# Patient Record
(demographics unavailable — no encounter records)

---

## 2024-10-24 NOTE — PHYSICAL EXAM
[Right] : right knee [Equivocal] : equivocal Missy [Left] : left knee [5___] : hamstring 5[unfilled]/5 [] : patient ambulates without assistive device [TWNoteComboBox7] : flexion 125 degrees [de-identified] : extension 0 degrees

## 2024-10-24 NOTE — HISTORY OF PRESENT ILLNESS
[Sudden] : sudden [5] : 5 [4] : 4 [Dull/Aching] : dull/aching [Intermittent] : intermittent [Rest] : rest [Meds] : meds [Walking] : walking [Stairs] : stairs [de-identified] : pain in bilateral knees, right greater than left. Pain for many months ago, she feels it was secondary to long distance driving and moving her daughter into school. Symptoms are worse with stairs, kneeling, twisting motions and occasionally feels like the knee is going to give way when descending stairs. The left knee started to trouble her more recently, feels it may be secondary to compensating for the right knee. She is taking Tylenol with some help. Poss allergy to NSAIds [] : no [FreeTextEntry1] : RT KNEE  [FreeTextEntry5] : Patient states a few months ago she was treated for right knee pain and had X-ray and aspiration done.  [FreeTextEntry6] : stiffness [FreeTextEntry9] : tylenol  [facility]  [de-identified] : MRI

## 2024-10-24 NOTE — DISCUSSION/SUMMARY
[de-identified] : modify activities use elastic sleeve for structural support try OTC meds ice as needed try topical lidocaine for pain control reviewed current medications used by this patient home exercises for functional return 10/24/2024  RE:  JOSIANE MCKEON   [id number]  Attention:  Nurse Reviewer /Medical Director  I am writing this letter as a medical necessity for HA euflexxa both knees Patient has tried analgesics, non-steroid anti-inflammatory agents,  physical therapy, hot or cold compresses,injections of corticosteroids, etc)  which in combination or by themselves has not worked. Based on my patient's condition, I strongly believe that the Hyaluronic aid injections is medically needed.   Thank you for your time and consideration.

## 2024-11-04 NOTE — PHYSICAL EXAM
[Right] : right knee [Equivocal] : equivocal Missy [Left] : left knee [5___] : hamstring 5[unfilled]/5 [] : negative Lachmann [TWNoteComboBox7] : flexion 125 degrees [de-identified] : extension 0 degrees

## 2024-11-04 NOTE — PHYSICAL EXAM
[Right] : right knee [Equivocal] : equivocal Missy [Left] : left knee [5___] : hamstring 5[unfilled]/5 [] : negative Lachmann [de-identified] : extension 0 degrees [TWNoteComboBox7] : flexion 125 degrees

## 2024-11-04 NOTE — DISCUSSION/SUMMARY
[de-identified] : rest, ice, activity modification   Frequency of visco and csi reviewed. Return in 6 weeks as needed.

## 2024-11-04 NOTE — HISTORY OF PRESENT ILLNESS
[de-identified] : Patient returns for her knees, she remains symptomatic, tolerating Euflexxa series well.

## 2024-11-04 NOTE — HISTORY OF PRESENT ILLNESS
[de-identified] : Patient returns for her knees, she remains symptomatic, tolerating Euflexxa series well.

## 2024-11-04 NOTE — PROCEDURE
[FreeTextEntry3] : Euflexxa (Large Joint) with Ultrasound Guidance Viscosupplementation Injection: X-ray evidence of Osteoarthritis on this or prior visit and Patient has tried OTC's including aspirin, Ibuprofen, Aleve etc or prescription NSAIDS, and/or exercises at home and/ or physical therapy without satisfactory response.  An injection of Euflexxa 2ml #_2_ was injected into the bilateral knee(s). after verbal consent using sterile technique. The risks, benefits, and alternatives to Viscosupplementation injection were explained in full to the patient. Risks outlined include but are not limited to infection, sepsis, bleeding, scarring, skin discoloration, temporary increase in pain, syncopal episode, failure to resolve symptoms, allergic reaction, and symptom recurrence. Signs and symptoms of infection reviewed and patient advised to call immediately for redness, fevers, and/or chills. Patient understood the risks. All questions were answered. After discussion of options, patient requested Viscosupplementation. Oral informed consent was obtained and sterile prep was done of the injection site. Sterile technique was used without complications. The patient tolerated the procedure well. Ice tonight to the injection site.   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   Ultrasound guided injection was performed of the knee, visualization of the needle and placement of injection was performed without complication.

## 2024-11-04 NOTE — DISCUSSION/SUMMARY
[de-identified] : rest, ice, activity modification   Frequency of visco and csi reviewed. Return in 6 weeks as needed.

## 2024-11-11 NOTE — DISCUSSION/SUMMARY
[de-identified] : rest, ice, activity modification   Frequency of visco and csi reviewed. Return in 6 weeks as needed.

## 2024-11-11 NOTE — PROCEDURE
[FreeTextEntry3] : Euflexxa (Large Joint) with Ultrasound Guidance Viscosupplementation Injection: X-ray evidence of Osteoarthritis on this or prior visit and Patient has tried OTC's including aspirin, Ibuprofen, Aleve etc or prescription NSAIDS, and/or exercises at home and/ or physical therapy without satisfactory response.  An injection of Euflexxa 2ml #_3_ was injected into the bilateral knee(s). after verbal consent using sterile technique. The risks, benefits, and alternatives to Viscosupplementation injection were explained in full to the patient. Risks outlined include but are not limited to infection, sepsis, bleeding, scarring, skin discoloration, temporary increase in pain, syncopal episode, failure to resolve symptoms, allergic reaction, and symptom recurrence. Signs and symptoms of infection reviewed and patient advised to call immediately for redness, fevers, and/or chills. Patient understood the risks. All questions were answered. After discussion of options, patient requested Viscosupplementation. Oral informed consent was obtained and sterile prep was done of the injection site. Sterile technique was used without complications. The patient tolerated the procedure well. Ice tonight to the injection site.   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   Ultrasound guided injection was performed of the knee, visualization of the needle and placement of injection was performed without complication.

## 2024-11-11 NOTE — HISTORY OF PRESENT ILLNESS
[de-identified] : Patient returns for her knees, she remains symptomatic, tolerating HA approved, no swelling, uses oTcs